# Patient Record
Sex: MALE | Race: WHITE | Employment: FULL TIME | URBAN - METROPOLITAN AREA
[De-identification: names, ages, dates, MRNs, and addresses within clinical notes are randomized per-mention and may not be internally consistent; named-entity substitution may affect disease eponyms.]

---

## 2019-06-07 ENCOUNTER — APPOINTMENT (OUTPATIENT)
Dept: GENERAL RADIOLOGY | Age: 39
End: 2019-06-07

## 2019-06-07 ENCOUNTER — APPOINTMENT (OUTPATIENT)
Dept: CT IMAGING | Age: 39
End: 2019-06-07

## 2019-06-07 ENCOUNTER — HOSPITAL ENCOUNTER (EMERGENCY)
Age: 39
Discharge: HOME | End: 2019-06-07
Attending: EMERGENCY MEDICINE

## 2019-06-07 VITALS
TEMPERATURE: 98.4 F | OXYGEN SATURATION: 98 % | DIASTOLIC BLOOD PRESSURE: 78 MMHG | RESPIRATION RATE: 20 BRPM | SYSTOLIC BLOOD PRESSURE: 134 MMHG | HEIGHT: 68 IN | BODY MASS INDEX: 28.95 KG/M2 | WEIGHT: 191 LBS | HEART RATE: 80 BPM

## 2019-06-07 DIAGNOSIS — R06.02 SOB (SHORTNESS OF BREATH): Primary | ICD-10-CM

## 2019-06-07 DIAGNOSIS — R91.8 PULMONARY NODULES: ICD-10-CM

## 2019-06-07 LAB
A/G RATIO: 1.4 (ref 1.1–2.2)
ALBUMIN SERPL-MCNC: 4.7 G/DL (ref 3.4–5)
ALP BLD-CCNC: 96 U/L (ref 40–129)
ALT SERPL-CCNC: 41 U/L (ref 10–40)
ANION GAP SERPL CALCULATED.3IONS-SCNC: 16 MMOL/L (ref 3–16)
AST SERPL-CCNC: 33 U/L (ref 15–37)
BASOPHILS ABSOLUTE: 0.1 K/UL (ref 0–0.2)
BASOPHILS RELATIVE PERCENT: 1 %
BILIRUB SERPL-MCNC: 0.4 MG/DL (ref 0–1)
BUN BLDV-MCNC: 12 MG/DL (ref 7–20)
CALCIUM SERPL-MCNC: 10 MG/DL (ref 8.3–10.6)
CHLORIDE BLD-SCNC: 103 MMOL/L (ref 99–110)
CO2: 24 MMOL/L (ref 21–32)
CREAT SERPL-MCNC: 0.8 MG/DL (ref 0.9–1.3)
D DIMER: <200 NG/ML DDU (ref 0–229)
EKG ATRIAL RATE: 103 BPM
EKG DIAGNOSIS: NORMAL
EKG P-R INTERVAL: 148 MS
EKG Q-T INTERVAL: 338 MS
EKG QRS DURATION: 92 MS
EKG QTC CALCULATION (BAZETT): 442 MS
EKG R AXIS: 214 DEGREES
EKG T AXIS: 174 DEGREES
EKG VENTRICULAR RATE: 103 BPM
EOSINOPHILS ABSOLUTE: 0.1 K/UL (ref 0–0.6)
EOSINOPHILS RELATIVE PERCENT: 0.6 %
GFR AFRICAN AMERICAN: >60
GFR NON-AFRICAN AMERICAN: >60
GLOBULIN: 3.3 G/DL
GLUCOSE BLD-MCNC: 127 MG/DL (ref 70–99)
HCT VFR BLD CALC: 49.1 % (ref 40.5–52.5)
HEMOGLOBIN: 16.5 G/DL (ref 13.5–17.5)
LIPASE: 22 U/L (ref 13–60)
LYMPHOCYTES ABSOLUTE: 2.7 K/UL (ref 1–5.1)
LYMPHOCYTES RELATIVE PERCENT: 20.1 %
MCH RBC QN AUTO: 32.3 PG (ref 26–34)
MCHC RBC AUTO-ENTMCNC: 33.5 G/DL (ref 31–36)
MCV RBC AUTO: 96.5 FL (ref 80–100)
MONOCYTES ABSOLUTE: 1 K/UL (ref 0–1.3)
MONOCYTES RELATIVE PERCENT: 7.3 %
NEUTROPHILS ABSOLUTE: 9.4 K/UL (ref 1.7–7.7)
NEUTROPHILS RELATIVE PERCENT: 71 %
PDW BLD-RTO: 13.7 % (ref 12.4–15.4)
PLATELET # BLD: 344 K/UL (ref 135–450)
PMV BLD AUTO: 7.1 FL (ref 5–10.5)
POTASSIUM SERPL-SCNC: 4 MMOL/L (ref 3.5–5.1)
RBC # BLD: 5.09 M/UL (ref 4.2–5.9)
SODIUM BLD-SCNC: 143 MMOL/L (ref 136–145)
TOTAL PROTEIN: 8 G/DL (ref 6.4–8.2)
WBC # BLD: 13.3 K/UL (ref 4–11)

## 2019-06-07 PROCEDURE — 74177 CT ABD & PELVIS W/CONTRAST: CPT

## 2019-06-07 PROCEDURE — 6370000000 HC RX 637 (ALT 250 FOR IP): Performed by: EMERGENCY MEDICINE

## 2019-06-07 PROCEDURE — 36415 COLL VENOUS BLD VENIPUNCTURE: CPT

## 2019-06-07 PROCEDURE — 2580000003 HC RX 258: Performed by: EMERGENCY MEDICINE

## 2019-06-07 PROCEDURE — 85379 FIBRIN DEGRADATION QUANT: CPT

## 2019-06-07 PROCEDURE — 80053 COMPREHEN METABOLIC PANEL: CPT

## 2019-06-07 PROCEDURE — 93010 ELECTROCARDIOGRAM REPORT: CPT | Performed by: INTERNAL MEDICINE

## 2019-06-07 PROCEDURE — 71046 X-RAY EXAM CHEST 2 VIEWS: CPT

## 2019-06-07 PROCEDURE — 96360 HYDRATION IV INFUSION INIT: CPT

## 2019-06-07 PROCEDURE — 93005 ELECTROCARDIOGRAM TRACING: CPT | Performed by: EMERGENCY MEDICINE

## 2019-06-07 PROCEDURE — 6360000004 HC RX CONTRAST MEDICATION: Performed by: EMERGENCY MEDICINE

## 2019-06-07 PROCEDURE — 99285 EMERGENCY DEPT VISIT HI MDM: CPT

## 2019-06-07 PROCEDURE — 71260 CT THORAX DX C+: CPT

## 2019-06-07 PROCEDURE — 85025 COMPLETE CBC W/AUTO DIFF WBC: CPT

## 2019-06-07 PROCEDURE — 83690 ASSAY OF LIPASE: CPT

## 2019-06-07 RX ORDER — DICYCLOMINE HYDROCHLORIDE 10 MG/1
10 CAPSULE ORAL ONCE
Status: COMPLETED | OUTPATIENT
Start: 2019-06-07 | End: 2019-06-07

## 2019-06-07 RX ORDER — 0.9 % SODIUM CHLORIDE 0.9 %
1000 INTRAVENOUS SOLUTION INTRAVENOUS ONCE
Status: COMPLETED | OUTPATIENT
Start: 2019-06-07 | End: 2019-06-07

## 2019-06-07 RX ORDER — IBUPROFEN 200 MG
200 TABLET ORAL EVERY 6 HOURS PRN
COMMUNITY

## 2019-06-07 RX ORDER — ALBUTEROL SULFATE 0.63 MG/3ML
1 SOLUTION RESPIRATORY (INHALATION) EVERY 6 HOURS PRN
COMMUNITY

## 2019-06-07 RX ADMIN — SODIUM CHLORIDE 1000 ML: 9 INJECTION, SOLUTION INTRAVENOUS at 10:50

## 2019-06-07 RX ADMIN — DICYCLOMINE HYDROCHLORIDE 10 MG: 10 CAPSULE ORAL at 10:49

## 2019-06-07 RX ADMIN — IOPAMIDOL 75 ML: 755 INJECTION, SOLUTION INTRAVENOUS at 11:32

## 2019-06-07 ASSESSMENT — PAIN DESCRIPTION - LOCATION
LOCATION: RIB CAGE
LOCATION: RIB CAGE

## 2019-06-07 ASSESSMENT — ENCOUNTER SYMPTOMS
BLOOD IN STOOL: 0
HEARTBURN: 0
SHORTNESS OF BREATH: 1
ABDOMINAL PAIN: 0
FACIAL SWELLING: 0
VOICE CHANGE: 0
BACK PAIN: 0
VOMITING: 0
COLOR CHANGE: 0
NAUSEA: 0
STRIDOR: 0
WHEEZING: 0
COUGH: 1
PHOTOPHOBIA: 0
TROUBLE SWALLOWING: 0

## 2019-06-07 ASSESSMENT — PAIN DESCRIPTION - ORIENTATION: ORIENTATION: LEFT;RIGHT

## 2019-06-07 ASSESSMENT — PAIN DESCRIPTION - PROGRESSION: CLINICAL_PROGRESSION: NOT CHANGED

## 2019-06-07 ASSESSMENT — PAIN DESCRIPTION - DESCRIPTORS
DESCRIPTORS: DULL
DESCRIPTORS: SQUEEZING;STABBING

## 2019-06-07 ASSESSMENT — PAIN SCALES - GENERAL
PAINLEVEL_OUTOF10: 3
PAINLEVEL_OUTOF10: 5

## 2019-06-07 ASSESSMENT — PAIN DESCRIPTION - PAIN TYPE: TYPE: ACUTE PAIN

## 2019-06-07 ASSESSMENT — PAIN - FUNCTIONAL ASSESSMENT: PAIN_FUNCTIONAL_ASSESSMENT: ACTIVITIES ARE NOT PREVENTED

## 2019-06-07 ASSESSMENT — PAIN DESCRIPTION - FREQUENCY: FREQUENCY: CONTINUOUS

## 2019-06-07 ASSESSMENT — PAIN DESCRIPTION - ONSET: ONSET: SUDDEN

## 2019-06-07 NOTE — ED NOTES
Bed: 04  Expected date:   Expected time:   Means of arrival:   Comments:  SOB pt     Vanda Nixon RN  06/07/19 1016

## 2019-06-07 NOTE — DISCHARGE INSTR - COC
Continuity of Care Form    Patient Name: Hallie Reyes   :  1980  MRN:  [id number]    Admit date:  2019  Discharge date:  ***    Code Status Order: Prior   Advance Directives:     Admitting Physician:  No admitting provider for patient encounter. PCP: Barney Campos MD    Discharging Nurse: [facility] Unit/Room#:   Discharging Unit Phone Number: ***    Emergency Contact:   Extended Emergency Contact Information  Primary Emergency Contact: [address] Phone: [phone]  Relation: Parent    Past Surgical History:  Past Surgical History:   Procedure Laterality Date    FINGER NAIL SURGERY         Immunization History: There is no immunization history on file for this patient.     Active Problems:  Patient Active Problem List   Diagnosis Code    Back pain M54.9       Isolation/Infection:   Isolation          No Isolation            Nurse Assessment:  Last Vital Signs: BP (!) 159/92   Pulse 107   Temp 98.4 °F (36.9 °C) (Oral)   Resp 17   Ht 5' 8\" (1.727 m)   Wt 191 lb (86.6 kg)   BMI 29.04 kg/m²     Last documented pain score (0-10 scale): Pain Level: 5  Last Weight:   Wt Readings from Last 1 Encounters:   19 191 lb (86.6 kg)     Mental Status:  {IP PT MENTAL STATUS:86365}    IV Access:  { RYLEY IV ACCESS:728698253}    Nursing Mobility/ADLs:  Walking   {CHP DME QYRM:332692162}  Transfer  {CHP DME COZR:490706641}  Bathing  {CHP DME QZGR:181878219}  Dressing  {CHP DME IWQD:667493696}  Toileting  {CHP DME DLYD:771552272}  Feeding  {CHP DME QDHN:992219302}  Med Admin  {CHP DME IRBN:820878927}  Med Delivery   [facility] MED Delivery:119459108}    Wound Care Documentation and Therapy:        Elimination:  Continence:   · Bowel: {YES / CD:39979}  · Bladder: {YES / CD:49141}  Urinary Catheter: {Urinary Catheter:974222885}   Colostomy/Ileostomy/Ileal Conduit: {YES / FM:95498}       Date of Last BM: ***  No intake or output data in the 24 hours ending 19 1219  No

## 2019-06-07 NOTE — ED PROVIDER NOTES
[facility]  eMERGENCY dEPARTMENT eNCOUnter      Pt Name: Evan Gruber  MRN: [id number]  [place] 1980  Date of evaluation: 6/7/2019  Provider: Richar Garcia MD    [address] [place] [address]       Chief Complaint   Patient presents with    Shortness of Breath     acute bronchitis x 12 weeks was diagnosed 3 weeks ago and treated with antibiotics and steroids, also advise night sweats. suddenly became worse 30 minutes piror to arrival with left and right rib pain that radiates into his back. HISTORY OF PRESENT ILLNESS   (Location/Symptom, Timing/Onset, Context/Setting, Quality, Duration, Modifying Factors, Severity)  Note limiting factors. The history is provided by the patient. Chest Pain   Pain location:  R chest  Pain quality: stabbing    Pain radiates to:  Mid back  Pain severity:  Severe  Onset quality:  Sudden  Duration:  30 days  Timing:  Constant  Progression:  Worsening  Chronicity:  New  Context comment:  Has had \"bronchitis\" for the past 3 weeks. Has been on steroids and inhaler with no improvement. Despite triage note patient denies taking any recent abx  Relieved by:  Nothing  Worsened by:  Deep breathing  Ineffective treatments:  None tried  Associated symptoms: cough and shortness of breath    Associated symptoms: no abdominal pain, no back pain, no dysphagia, no fever, no heartburn, no nausea, no palpitations, no vomiting and no weakness    Risk factors: smoking    Risk factors: no coronary artery disease, no diabetes mellitus, no high cholesterol, no hypertension (patient denies), not obese and no prior DVT/PE        Nursing Notes were reviewed. REVIEW OFSYSTEMS    (2-9 systems for level 4, 10 or more for level 5)     Review of Systems   Constitutional: Negative for appetite change, fever and unexpected weight change. HENT: Negative for facial swelling, trouble swallowing and voice change. Eyes: Negative for photophobia and visual disturbance. Respiratory: Positive for cough and shortness of breath. Negative for wheezing and stridor. Cardiovascular: Positive for chest pain. Negative for palpitations. Gastrointestinal: Negative for abdominal pain, blood in stool, heartburn, nausea and vomiting. Genitourinary: Negative for difficulty urinating and dysuria. Musculoskeletal: Negative for back pain, gait problem and neck pain. Skin: Negative for color change and wound. Neurological: Negative for seizures, syncope, speech difficulty and weakness. Psychiatric/Behavioral: Negative for self-injury and suicidal ideas. Except as noted above the remainder of the review of systems was reviewed and negative. PAST MEDICAL HISTORY     Past Medical History:   Diagnosis Date    Back pain 6/1/2015    Bronchitis     Kidney stone     left.  Paronychia of finger          SURGICAL HISTORY       Past Surgical History:   Procedure Laterality Date    FINGER NAIL SURGERY           CURRENT MEDICATIONS       Previous Medications    ALBUTEROL (ACCUNEB) 0.63 MG/3ML NEBULIZER SOLUTION    Take 1 ampule by nebulization every 6 hours as needed for Wheezing    IBUPROFEN (ADVIL;MOTRIN) 200 MG TABLET    Take 200 mg by mouth every 6 hours as needed for Pain       ALLERGIES     Vancomycin    FAMILY HISTORY     History reviewed. No pertinent family history. SOCIAL HISTORY       Social History     Socioeconomic History    Marital status: Single     Spouse name: None    Number of children: None    Years of education: None    Highest education level: None   Occupational History    None   Social Needs    Financial resource strain: None    Food insecurity:     Worry: None     Inability: None    Transportation needs:     Medical: None     Non-medical: None   Tobacco Use    Smoking status: Current Every Day Smoker     Packs/day: 0.50    Smokeless tobacco: Never Used   Substance and Sexual Activity    Alcohol use: Yes     Comment: occ    Drug use:  No Nursing note and vitals reviewed. DIAGNOSTIC RESULTS     EKG:All EKG's are interpreted by the Emergency Department Physician who either signs or Co-signs this chart in the absence of a cardiologist.    The Ekg interpreted by me shows  sinus tachycardia, fsla=047   Axis is   Right axis deviation  QTc is  442ms  Intervals and Durations are unremarkable. ST Segments: Inverted T waves in 1 and aVL, nonspecific changes in inferior leads  New nonspecific changes from previous EKG on 7/14/2013      RADIOLOGY:     Interpretation per the Radiologist below, if available at the time of this note:    CT ABDOMEN PELVIS W IV CONTRAST Additional Contrast? None   Final Result   No acute intra-abdominal or pelvic abnormality         CT CHEST PULMONARY EMBOLISM W CONTRAST   Preliminary Result   1. Multiple bilateral pulmonary nodules with the largest in the subpleural   left upper lobe measuring 8 mm. Smaller adjacent pulmonary nodules are also   present. The left upper lobe pulmonary nodules are new compared with   07/15/2013. 2. Mild dependent atelectasis in both lungs. No acute focal airspace   consolidation. 3. No obvious filling defect identified within the pulmonary arterial   vasculature within the limitations of this study. 4. Fatty liver. RECOMMENDATIONS:   Multiple pulmonary nodules. Most severe: 8.0 mm solid pulmonary nodule within   the upper lobe. Recommend a non-contrast Chest CT at 3-6 months, then   consider another non-contrast Chest CT at 18-24 months. These guidelines do not apply to patients younger than 35 years,   immunocompromised patients, and patients with cancer. Follow up in patients   with significant comorbidities as clinically warranted. For lung cancer   screening, adhere to Lung-RADS guidelines. Reference: Radiology. 2017;   284(1):228-43. XR CHEST STANDARD (2 VW)   Final Result   No acute focal airspace consolidation.                ED BEDSIDE ULTRASOUND: Performed by ED Physician - none    LABS:  Labs Reviewed   CBC WITH AUTO DIFFERENTIAL - Abnormal; Notable for the following components:       Result Value    WBC 13.3 (*)     Neutrophils # 9.4 (*)     All other components within normal limits    Narrative:     Performed at:  [facility]. [facility]  [address],  [place] [address]. [facility]   Phone [phone]   COMPREHENSIVE METABOLIC PANEL - Abnormal; Notable for the following components:    Glucose 127 (*)     CREATININE 0.8 (*)     ALT 41 (*)     All other components within normal limits    Narrative:     Performed at:  [facility]. [facility]  [address],  [place] [address]. [facility]   Phone [phone]   LIPASE    Narrative:     Performed at:  [facility]. [facility]  [address],  [place] [address]. [facility]   Phone [phone]   D-DIMER, QUANTITATIVE    Narrative:     Performed at:  [facility]. [facility]  [employer],  [place] [address]. [facility]   Phone [phone]       All otherlabs were within normal range or not returned as of this dictation. EMERGENCY DEPARTMENT COURSE and DIFFERENTIAL DIAGNOSIS/MDM:   Vitals:    Vitals:    06/07/19 1023   BP: (!) 159/92   Pulse: 107   Resp: 17   Temp: 98.4 °F (36.9 °C)   TempSrc: Oral   Weight: 191 lb (86.6 kg)   Height: 5' 8\" (1.727 m)         MDM   Laboratory evaluation clean troponin is unremarkable. CTA chest reveals multiple pulmonary nodules but does not show any evidence of acute PE or infection. CT of pelvis remainder of labs are unremarkable. I do not suspect ACS based on her long duration of symptoms with negative troponin. Patient is also reevaluated and reports complete resolution and his chest pain and shortness of breath further making ACS or acute process is likely.   I discussed in detail with the finding of the pulmonary nodules and the fact that along with the patient's smoking history I am concerned about possible tumors and normally see. The importance of repeat CT in 3 months stressed to the patient and he will follow-up with his primary care doctor for reevaluation of shortness of breath and scheduling of his outpatient CT scan. He'll return to the emergency department in the meantime for any new or worsening symptoms. The patient expresses understanding and agreement with this plan and is discharged home. I have considered and evaluated for the following diagnoses and estimate there is low risk for acute coronary syndrome, pericardial tamponade, pneumothorax, pulmonary embolism, sepsis, aortic dissection, or severe case of COPD and/or pneumonia which require inpatient care and thus I consider the discharge disposition reasonable. We have discussed the diagnosis and risks, and we agree with discharging home to follow-up with their primary doctor. We also discussed returning to the Emergency Department immediately if new or worsening symptoms occur. We have discussed the symptoms which are most concerning (e.g., worsening shortness of breath or trouble breathing, chest pain, blueness around the lips or extremities, or other concerning sympoms) that necessitate immediate return. Procedures    FINAL IMPRESSION      1. SOB (shortness of breath)    2. Pulmonary nodules          DISPOSITION/PLAN   DISPOSITION Decision To Discharge 06/07/2019 12:18:33 PM      PATIENT REFERRED TO:  Aydee Torres MD  [address] [place] [address]    In 1 week      [place]. [facility] Emergency Department  [address]  [phone]    If symptoms worsen      (Please note that portions of this note were completed with a voice recognition program.  Efforts were made to edit the dictations but occasionally words aremis-transcribed. )    Richar Garcia MD (electronically signed)  Attending Emergency Physician